# Patient Record
Sex: MALE | Race: BLACK OR AFRICAN AMERICAN | NOT HISPANIC OR LATINO | Employment: UNEMPLOYED | URBAN - METROPOLITAN AREA
[De-identification: names, ages, dates, MRNs, and addresses within clinical notes are randomized per-mention and may not be internally consistent; named-entity substitution may affect disease eponyms.]

---

## 2022-06-09 LAB
ANION GAP SERPL CALCULATED.3IONS-SCNC: 6 MMOL/L (ref 3–14)
BASOPHILS # BLD AUTO: 0 10E3/UL (ref 0–0.2)
BASOPHILS NFR BLD AUTO: 0 %
BUN SERPL-MCNC: 13 MG/DL (ref 7–30)
CALCIUM SERPL-MCNC: 10.4 MG/DL (ref 8.5–10.1)
CHLORIDE BLD-SCNC: 107 MMOL/L (ref 94–109)
CO2 SERPL-SCNC: 24 MMOL/L (ref 20–32)
CREAT SERPL-MCNC: 0.79 MG/DL (ref 0.66–1.25)
EOSINOPHIL # BLD AUTO: 0 10E3/UL (ref 0–0.7)
EOSINOPHIL NFR BLD AUTO: 0 %
ERYTHROCYTE [DISTWIDTH] IN BLOOD BY AUTOMATED COUNT: 15.2 % (ref 10–15)
GFR SERPL CREATININE-BSD FRML MDRD: >90 ML/MIN/1.73M2
GLUCOSE BLD-MCNC: 138 MG/DL (ref 70–99)
HCT VFR BLD AUTO: 43.9 % (ref 40–53)
HGB BLD-MCNC: 14.7 G/DL (ref 13.3–17.7)
IMM GRANULOCYTES # BLD: 0 10E3/UL
IMM GRANULOCYTES NFR BLD: 0 %
LYMPHOCYTES # BLD AUTO: 0.8 10E3/UL (ref 0.8–5.3)
LYMPHOCYTES NFR BLD AUTO: 8 %
MCH RBC QN AUTO: 26.2 PG (ref 26.5–33)
MCHC RBC AUTO-ENTMCNC: 33.5 G/DL (ref 31.5–36.5)
MCV RBC AUTO: 78 FL (ref 78–100)
MONOCYTES # BLD AUTO: 0.3 10E3/UL (ref 0–1.3)
MONOCYTES NFR BLD AUTO: 3 %
NEUTROPHILS # BLD AUTO: 9 10E3/UL (ref 1.6–8.3)
NEUTROPHILS NFR BLD AUTO: 89 %
NRBC # BLD AUTO: 0 10E3/UL
NRBC BLD AUTO-RTO: 0 /100
PLATELET # BLD AUTO: 407 10E3/UL (ref 150–450)
POTASSIUM BLD-SCNC: 3.9 MMOL/L (ref 3.4–5.3)
RBC # BLD AUTO: 5.62 10E6/UL (ref 4.4–5.9)
SODIUM SERPL-SCNC: 137 MMOL/L (ref 133–144)
WBC # BLD AUTO: 10.2 10E3/UL (ref 4–11)

## 2022-06-09 PROCEDURE — 99285 EMERGENCY DEPT VISIT HI MDM: CPT | Mod: 25

## 2022-06-09 PROCEDURE — 80048 BASIC METABOLIC PNL TOTAL CA: CPT | Performed by: EMERGENCY MEDICINE

## 2022-06-09 PROCEDURE — 36415 COLL VENOUS BLD VENIPUNCTURE: CPT | Performed by: EMERGENCY MEDICINE

## 2022-06-09 PROCEDURE — 85025 COMPLETE CBC W/AUTO DIFF WBC: CPT | Performed by: EMERGENCY MEDICINE

## 2022-06-09 PROCEDURE — 87637 SARSCOV2&INF A&B&RSV AMP PRB: CPT | Performed by: EMERGENCY MEDICINE

## 2022-06-10 ENCOUNTER — HOSPITAL ENCOUNTER (EMERGENCY)
Facility: CLINIC | Age: 27
Discharge: HOME | End: 2022-06-10
Attending: EMERGENCY MEDICINE | Admitting: EMERGENCY MEDICINE
Payer: COMMERCIAL

## 2022-06-10 VITALS
RESPIRATION RATE: 20 BRPM | TEMPERATURE: 99 F | OXYGEN SATURATION: 100 % | WEIGHT: 150.79 LBS | DIASTOLIC BLOOD PRESSURE: 61 MMHG | SYSTOLIC BLOOD PRESSURE: 108 MMHG | HEART RATE: 74 BPM

## 2022-06-10 DIAGNOSIS — F11.93 OPIOID WITHDRAWAL (H): ICD-10-CM

## 2022-06-10 LAB
FLUAV RNA SPEC QL NAA+PROBE: NEGATIVE
FLUBV RNA RESP QL NAA+PROBE: NEGATIVE
RSV RNA SPEC NAA+PROBE: NEGATIVE
SARS-COV-2 RNA RESP QL NAA+PROBE: NEGATIVE

## 2022-06-10 PROCEDURE — 250N000011 HC RX IP 250 OP 636: Performed by: EMERGENCY MEDICINE

## 2022-06-10 PROCEDURE — 258N000003 HC RX IP 258 OP 636: Performed by: EMERGENCY MEDICINE

## 2022-06-10 PROCEDURE — 96361 HYDRATE IV INFUSION ADD-ON: CPT

## 2022-06-10 PROCEDURE — 96376 TX/PRO/DX INJ SAME DRUG ADON: CPT

## 2022-06-10 PROCEDURE — 96375 TX/PRO/DX INJ NEW DRUG ADDON: CPT

## 2022-06-10 PROCEDURE — 96374 THER/PROPH/DIAG INJ IV PUSH: CPT

## 2022-06-10 PROCEDURE — 250N000013 HC RX MED GY IP 250 OP 250 PS 637: Performed by: EMERGENCY MEDICINE

## 2022-06-10 RX ORDER — ONDANSETRON 2 MG/ML
4 INJECTION INTRAMUSCULAR; INTRAVENOUS EVERY 30 MIN PRN
Status: DISCONTINUED | OUTPATIENT
Start: 2022-06-10 | End: 2022-06-10 | Stop reason: HOSPADM

## 2022-06-10 RX ORDER — METOCLOPRAMIDE HYDROCHLORIDE 5 MG/ML
10 INJECTION INTRAMUSCULAR; INTRAVENOUS ONCE
Status: COMPLETED | OUTPATIENT
Start: 2022-06-10 | End: 2022-06-10

## 2022-06-10 RX ORDER — BUPRENORPHINE AND NALOXONE 2; .5 MG/1; MG/1
1 FILM, SOLUBLE BUCCAL; SUBLINGUAL ONCE
Status: DISCONTINUED | OUTPATIENT
Start: 2022-06-10 | End: 2022-06-10 | Stop reason: HOSPADM

## 2022-06-10 RX ORDER — ONDANSETRON 4 MG/1
4 TABLET, ORALLY DISINTEGRATING ORAL EVERY 8 HOURS PRN
Qty: 10 TABLET | Refills: 0 | Status: SHIPPED | OUTPATIENT
Start: 2022-06-10 | End: 2022-06-13

## 2022-06-10 RX ORDER — LORAZEPAM 2 MG/ML
1 INJECTION INTRAMUSCULAR ONCE
Status: COMPLETED | OUTPATIENT
Start: 2022-06-10 | End: 2022-06-10

## 2022-06-10 RX ORDER — KETOROLAC TROMETHAMINE 30 MG/ML
30 INJECTION, SOLUTION INTRAMUSCULAR; INTRAVENOUS ONCE
Status: COMPLETED | OUTPATIENT
Start: 2022-06-10 | End: 2022-06-10

## 2022-06-10 RX ORDER — CLONIDINE HYDROCHLORIDE 0.1 MG/1
0.1 TABLET ORAL ONCE
Status: COMPLETED | OUTPATIENT
Start: 2022-06-10 | End: 2022-06-10

## 2022-06-10 RX ADMIN — LORAZEPAM 1 MG: 2 INJECTION INTRAMUSCULAR at 02:10

## 2022-06-10 RX ADMIN — METOCLOPRAMIDE HYDROCHLORIDE 10 MG: 5 INJECTION INTRAMUSCULAR; INTRAVENOUS at 01:24

## 2022-06-10 RX ADMIN — CLONIDINE HYDROCHLORIDE 0.1 MG: 0.1 TABLET ORAL at 01:46

## 2022-06-10 RX ADMIN — SODIUM CHLORIDE 1000 ML: 9 INJECTION, SOLUTION INTRAVENOUS at 00:43

## 2022-06-10 RX ADMIN — ONDANSETRON 4 MG: 2 INJECTION INTRAMUSCULAR; INTRAVENOUS at 01:22

## 2022-06-10 RX ADMIN — KETOROLAC TROMETHAMINE 30 MG: 30 INJECTION, SOLUTION INTRAMUSCULAR at 00:43

## 2022-06-10 RX ADMIN — ONDANSETRON 4 MG: 2 INJECTION INTRAMUSCULAR; INTRAVENOUS at 00:43

## 2022-06-10 ASSESSMENT — ENCOUNTER SYMPTOMS
MYALGIAS: 1
DIARRHEA: 1
CHILLS: 1
NAUSEA: 1
ABDOMINAL PAIN: 1
VOMITING: 1

## 2022-06-10 NOTE — ED TRIAGE NOTES
body aches, shakes, stomach pain, nausea, vomiting, dry heaves for last 12 hours. No one else at home is ill. Pt has tylenol and ibuprofen at home but has not taken any.pt vomit x10-20.
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
## 2022-06-10 NOTE — ED PROVIDER NOTES
Received signout from Dr. Chand to follow-up on opiate withdrawal.  I did order a dose of Suboxone and will discharge home with a course of Zofran he is tolerating p.o. and is appropriate for outpatient management     Maxi Lynn MD  06/10/22 2823

## 2022-06-11 ENCOUNTER — HOSPITAL ENCOUNTER (EMERGENCY)
Facility: CLINIC | Age: 27
Discharge: HOME | End: 2022-06-11
Attending: EMERGENCY MEDICINE | Admitting: EMERGENCY MEDICINE
Payer: COMMERCIAL

## 2022-06-11 VITALS
TEMPERATURE: 98.8 F | DIASTOLIC BLOOD PRESSURE: 79 MMHG | RESPIRATION RATE: 18 BRPM | HEART RATE: 72 BPM | OXYGEN SATURATION: 99 % | SYSTOLIC BLOOD PRESSURE: 122 MMHG

## 2022-06-11 VITALS
TEMPERATURE: 97.8 F | SYSTOLIC BLOOD PRESSURE: 118 MMHG | OXYGEN SATURATION: 100 % | DIASTOLIC BLOOD PRESSURE: 64 MMHG | RESPIRATION RATE: 12 BRPM | HEART RATE: 71 BPM

## 2022-06-11 DIAGNOSIS — F11.90 OPIOID USE: ICD-10-CM

## 2022-06-11 DIAGNOSIS — R11.2 NON-INTRACTABLE VOMITING WITH NAUSEA: ICD-10-CM

## 2022-06-11 DIAGNOSIS — R94.31 ABNORMAL ELECTROCARDIOGRAM: ICD-10-CM

## 2022-06-11 DIAGNOSIS — F12.90 MARIJUANA USE: ICD-10-CM

## 2022-06-11 DIAGNOSIS — R19.7 NAUSEA VOMITING AND DIARRHEA: ICD-10-CM

## 2022-06-11 DIAGNOSIS — F11.90 OPIATE USE: ICD-10-CM

## 2022-06-11 DIAGNOSIS — R11.2 NAUSEA VOMITING AND DIARRHEA: ICD-10-CM

## 2022-06-11 LAB
ALBUMIN SERPL-MCNC: 4.5 G/DL (ref 3.4–5)
ALBUMIN SERPL-MCNC: 4.9 G/DL (ref 3.4–5)
ALP SERPL-CCNC: 62 U/L (ref 40–150)
ALP SERPL-CCNC: 66 U/L (ref 40–150)
ALT SERPL W P-5'-P-CCNC: 21 U/L (ref 0–70)
ALT SERPL W P-5'-P-CCNC: 23 U/L (ref 0–70)
ANION GAP SERPL CALCULATED.3IONS-SCNC: 7 MMOL/L (ref 3–14)
ANION GAP SERPL CALCULATED.3IONS-SCNC: 8 MMOL/L (ref 3–14)
AST SERPL W P-5'-P-CCNC: 18 U/L (ref 0–45)
AST SERPL W P-5'-P-CCNC: 44 U/L (ref 0–45)
ATRIAL RATE - MUSE: 68 BPM
ATRIAL RATE - MUSE: 71 BPM
BASOPHILS # BLD AUTO: 0 10E3/UL (ref 0–0.2)
BASOPHILS # BLD AUTO: 0 10E3/UL (ref 0–0.2)
BASOPHILS NFR BLD AUTO: 0 %
BASOPHILS NFR BLD AUTO: 0 %
BILIRUB SERPL-MCNC: 1 MG/DL (ref 0.2–1.3)
BILIRUB SERPL-MCNC: 1.1 MG/DL (ref 0.2–1.3)
BUN SERPL-MCNC: 27 MG/DL (ref 7–30)
BUN SERPL-MCNC: 28 MG/DL (ref 7–30)
CALCIUM SERPL-MCNC: 10.1 MG/DL (ref 8.5–10.1)
CALCIUM SERPL-MCNC: 9.8 MG/DL (ref 8.5–10.1)
CHLORIDE BLD-SCNC: 107 MMOL/L (ref 94–109)
CHLORIDE BLD-SCNC: 109 MMOL/L (ref 94–109)
CO2 SERPL-SCNC: 25 MMOL/L (ref 20–32)
CO2 SERPL-SCNC: 27 MMOL/L (ref 20–32)
CREAT SERPL-MCNC: 0.94 MG/DL (ref 0.66–1.25)
CREAT SERPL-MCNC: 0.98 MG/DL (ref 0.66–1.25)
DIASTOLIC BLOOD PRESSURE - MUSE: NORMAL MMHG
DIASTOLIC BLOOD PRESSURE - MUSE: NORMAL MMHG
EOSINOPHIL # BLD AUTO: 0 10E3/UL (ref 0–0.7)
EOSINOPHIL # BLD AUTO: 0 10E3/UL (ref 0–0.7)
EOSINOPHIL NFR BLD AUTO: 0 %
EOSINOPHIL NFR BLD AUTO: 0 %
ERYTHROCYTE [DISTWIDTH] IN BLOOD BY AUTOMATED COUNT: 15.2 % (ref 10–15)
ERYTHROCYTE [DISTWIDTH] IN BLOOD BY AUTOMATED COUNT: 15.3 % (ref 10–15)
FLUAV RNA SPEC QL NAA+PROBE: NEGATIVE
FLUBV RNA RESP QL NAA+PROBE: NEGATIVE
GFR SERPL CREATININE-BSD FRML MDRD: >90 ML/MIN/1.73M2
GFR SERPL CREATININE-BSD FRML MDRD: >90 ML/MIN/1.73M2
GLUCOSE BLD-MCNC: 111 MG/DL (ref 70–99)
GLUCOSE BLD-MCNC: 128 MG/DL (ref 70–99)
HCT VFR BLD AUTO: 44.2 % (ref 40–53)
HCT VFR BLD AUTO: 45.2 % (ref 40–53)
HGB BLD-MCNC: 14.8 G/DL (ref 13.3–17.7)
HGB BLD-MCNC: 15 G/DL (ref 13.3–17.7)
IMM GRANULOCYTES # BLD: 0 10E3/UL
IMM GRANULOCYTES # BLD: 0 10E3/UL
IMM GRANULOCYTES NFR BLD: 0 %
IMM GRANULOCYTES NFR BLD: 0 %
INTERPRETATION ECG - MUSE: NORMAL
INTERPRETATION ECG - MUSE: NORMAL
LIPASE SERPL-CCNC: 68 U/L (ref 73–393)
LYMPHOCYTES # BLD AUTO: 1.3 10E3/UL (ref 0.8–5.3)
LYMPHOCYTES # BLD AUTO: 1.6 10E3/UL (ref 0.8–5.3)
LYMPHOCYTES NFR BLD AUTO: 16 %
LYMPHOCYTES NFR BLD AUTO: 17 %
MAGNESIUM SERPL-MCNC: 2.6 MG/DL (ref 1.6–2.3)
MAGNESIUM SERPL-MCNC: 3 MG/DL (ref 1.6–2.3)
MCH RBC QN AUTO: 26 PG (ref 26.5–33)
MCH RBC QN AUTO: 26.1 PG (ref 26.5–33)
MCHC RBC AUTO-ENTMCNC: 32.7 G/DL (ref 31.5–36.5)
MCHC RBC AUTO-ENTMCNC: 33.9 G/DL (ref 31.5–36.5)
MCV RBC AUTO: 77 FL (ref 78–100)
MCV RBC AUTO: 79 FL (ref 78–100)
MONOCYTES # BLD AUTO: 0.7 10E3/UL (ref 0–1.3)
MONOCYTES # BLD AUTO: 1 10E3/UL (ref 0–1.3)
MONOCYTES NFR BLD AUTO: 10 %
MONOCYTES NFR BLD AUTO: 9 %
NEUTROPHILS # BLD AUTO: 5.7 10E3/UL (ref 1.6–8.3)
NEUTROPHILS # BLD AUTO: 7.6 10E3/UL (ref 1.6–8.3)
NEUTROPHILS NFR BLD AUTO: 74 %
NEUTROPHILS NFR BLD AUTO: 74 %
NRBC # BLD AUTO: 0 10E3/UL
NRBC # BLD AUTO: 0 10E3/UL
NRBC BLD AUTO-RTO: 0 /100
NRBC BLD AUTO-RTO: 0 /100
P AXIS - MUSE: 74 DEGREES
P AXIS - MUSE: 75 DEGREES
PLATELET # BLD AUTO: 347 10E3/UL (ref 150–450)
PLATELET # BLD AUTO: 399 10E3/UL (ref 150–450)
POTASSIUM BLD-SCNC: 3.5 MMOL/L (ref 3.4–5.3)
POTASSIUM BLD-SCNC: 3.8 MMOL/L (ref 3.4–5.3)
PR INTERVAL - MUSE: 138 MS
PR INTERVAL - MUSE: 138 MS
PROT SERPL-MCNC: 8.9 G/DL (ref 6.8–8.8)
PROT SERPL-MCNC: 9.2 G/DL (ref 6.8–8.8)
QRS DURATION - MUSE: 96 MS
QRS DURATION - MUSE: 96 MS
QT - MUSE: 416 MS
QT - MUSE: 420 MS
QTC - MUSE: 446 MS
QTC - MUSE: 452 MS
R AXIS - MUSE: 47 DEGREES
R AXIS - MUSE: 53 DEGREES
RBC # BLD AUTO: 5.7 10E6/UL (ref 4.4–5.9)
RBC # BLD AUTO: 5.75 10E6/UL (ref 4.4–5.9)
RSV RNA SPEC NAA+PROBE: NEGATIVE
SARS-COV-2 RNA RESP QL NAA+PROBE: NEGATIVE
SODIUM SERPL-SCNC: 140 MMOL/L (ref 133–144)
SODIUM SERPL-SCNC: 143 MMOL/L (ref 133–144)
SYSTOLIC BLOOD PRESSURE - MUSE: NORMAL MMHG
SYSTOLIC BLOOD PRESSURE - MUSE: NORMAL MMHG
T AXIS - MUSE: -30 DEGREES
T AXIS - MUSE: -44 DEGREES
TROPONIN I SERPL HS-MCNC: 4 NG/L
VENTRICULAR RATE- MUSE: 68 BPM
VENTRICULAR RATE- MUSE: 71 BPM
WBC # BLD AUTO: 10.3 10E3/UL (ref 4–11)
WBC # BLD AUTO: 7.8 10E3/UL (ref 4–11)

## 2022-06-11 PROCEDURE — 85025 COMPLETE CBC W/AUTO DIFF WBC: CPT | Performed by: EMERGENCY MEDICINE

## 2022-06-11 PROCEDURE — C9803 HOPD COVID-19 SPEC COLLECT: HCPCS

## 2022-06-11 PROCEDURE — 84484 ASSAY OF TROPONIN QUANT: CPT | Performed by: EMERGENCY MEDICINE

## 2022-06-11 PROCEDURE — 93005 ELECTROCARDIOGRAM TRACING: CPT | Mod: RTG

## 2022-06-11 PROCEDURE — 36415 COLL VENOUS BLD VENIPUNCTURE: CPT | Performed by: EMERGENCY MEDICINE

## 2022-06-11 PROCEDURE — 250N000011 HC RX IP 250 OP 636: Performed by: EMERGENCY MEDICINE

## 2022-06-11 PROCEDURE — 84155 ASSAY OF PROTEIN SERUM: CPT | Performed by: EMERGENCY MEDICINE

## 2022-06-11 PROCEDURE — 96375 TX/PRO/DX INJ NEW DRUG ADDON: CPT

## 2022-06-11 PROCEDURE — 83690 ASSAY OF LIPASE: CPT | Performed by: EMERGENCY MEDICINE

## 2022-06-11 PROCEDURE — 99285 EMERGENCY DEPT VISIT HI MDM: CPT | Mod: 25

## 2022-06-11 PROCEDURE — 87637 SARSCOV2&INF A&B&RSV AMP PRB: CPT | Performed by: EMERGENCY MEDICINE

## 2022-06-11 PROCEDURE — 250N000013 HC RX MED GY IP 250 OP 250 PS 637: Performed by: EMERGENCY MEDICINE

## 2022-06-11 PROCEDURE — 83735 ASSAY OF MAGNESIUM: CPT | Performed by: EMERGENCY MEDICINE

## 2022-06-11 PROCEDURE — 258N000003 HC RX IP 258 OP 636: Performed by: EMERGENCY MEDICINE

## 2022-06-11 PROCEDURE — 96361 HYDRATE IV INFUSION ADD-ON: CPT

## 2022-06-11 PROCEDURE — 80053 COMPREHEN METABOLIC PANEL: CPT | Performed by: EMERGENCY MEDICINE

## 2022-06-11 PROCEDURE — 96374 THER/PROPH/DIAG INJ IV PUSH: CPT

## 2022-06-11 PROCEDURE — 96376 TX/PRO/DX INJ SAME DRUG ADON: CPT

## 2022-06-11 RX ORDER — LORAZEPAM 0.5 MG/1
0.5 TABLET ORAL ONCE
Status: COMPLETED | OUTPATIENT
Start: 2022-06-11 | End: 2022-06-11

## 2022-06-11 RX ORDER — SODIUM CHLORIDE 9 MG/ML
1000 INJECTION, SOLUTION INTRAVENOUS CONTINUOUS
Status: DISCONTINUED | OUTPATIENT
Start: 2022-06-11 | End: 2022-06-12 | Stop reason: HOSPADM

## 2022-06-11 RX ORDER — DIPHENHYDRAMINE HYDROCHLORIDE 50 MG/ML
25 INJECTION INTRAMUSCULAR; INTRAVENOUS ONCE
Status: COMPLETED | OUTPATIENT
Start: 2022-06-11 | End: 2022-06-11

## 2022-06-11 RX ORDER — ONDANSETRON 4 MG/1
4 TABLET, ORALLY DISINTEGRATING ORAL EVERY 8 HOURS PRN
Qty: 10 TABLET | Refills: 0 | Status: SHIPPED | OUTPATIENT
Start: 2022-06-11 | End: 2022-06-14

## 2022-06-11 RX ORDER — LIDOCAINE HYDROCHLORIDE 10 MG/ML
INJECTION, SOLUTION EPIDURAL; INFILTRATION; INTRACAUDAL; PERINEURAL
Status: DISCONTINUED
Start: 2022-06-11 | End: 2022-06-12 | Stop reason: HOSPADM

## 2022-06-11 RX ORDER — ONDANSETRON 2 MG/ML
4 INJECTION INTRAMUSCULAR; INTRAVENOUS ONCE
Status: COMPLETED | OUTPATIENT
Start: 2022-06-11 | End: 2022-06-11

## 2022-06-11 RX ORDER — CLONIDINE HYDROCHLORIDE 0.1 MG/1
0.1 TABLET ORAL ONCE
Status: DISCONTINUED | OUTPATIENT
Start: 2022-06-11 | End: 2022-06-11

## 2022-06-11 RX ORDER — LORAZEPAM 2 MG/ML
0.5 INJECTION INTRAMUSCULAR ONCE
Status: COMPLETED | OUTPATIENT
Start: 2022-06-11 | End: 2022-06-11

## 2022-06-11 RX ORDER — DROPERIDOL 2.5 MG/ML
0.62 INJECTION, SOLUTION INTRAMUSCULAR; INTRAVENOUS ONCE
Status: COMPLETED | OUTPATIENT
Start: 2022-06-11 | End: 2022-06-11

## 2022-06-11 RX ADMIN — SODIUM CHLORIDE 1000 ML: 9 INJECTION, SOLUTION INTRAVENOUS at 20:09

## 2022-06-11 RX ADMIN — ONDANSETRON 4 MG: 2 INJECTION INTRAMUSCULAR; INTRAVENOUS at 02:59

## 2022-06-11 RX ADMIN — DIPHENHYDRAMINE HYDROCHLORIDE 25 MG: 50 INJECTION, SOLUTION INTRAMUSCULAR; INTRAVENOUS at 18:50

## 2022-06-11 RX ADMIN — DROPERIDOL 0.62 MG: 2.5 INJECTION, SOLUTION INTRAMUSCULAR; INTRAVENOUS at 20:09

## 2022-06-11 RX ADMIN — LORAZEPAM 0.5 MG: 2 INJECTION INTRAMUSCULAR; INTRAVENOUS at 21:29

## 2022-06-11 RX ADMIN — FAMOTIDINE 20 MG: 10 INJECTION, SOLUTION INTRAVENOUS at 03:18

## 2022-06-11 RX ADMIN — LORAZEPAM 0.5 MG: 0.5 TABLET ORAL at 04:43

## 2022-06-11 RX ADMIN — DROPERIDOL 0.62 MG: 2.5 INJECTION, SOLUTION INTRAMUSCULAR; INTRAVENOUS at 18:52

## 2022-06-11 RX ADMIN — LORAZEPAM 0.5 MG: 2 INJECTION INTRAMUSCULAR; INTRAVENOUS at 03:19

## 2022-06-11 RX ADMIN — SODIUM CHLORIDE 1000 ML: 9 INJECTION, SOLUTION INTRAVENOUS at 02:58

## 2022-06-11 ASSESSMENT — ENCOUNTER SYMPTOMS
BLOOD IN STOOL: 0
NAUSEA: 1
HEADACHES: 1
CHILLS: 0
DIARRHEA: 1
SHORTNESS OF BREATH: 0
VOMITING: 1
FEVER: 0
NAUSEA: 1
DIARRHEA: 1
VOMITING: 1
ABDOMINAL PAIN: 1

## 2022-06-11 NOTE — ED PROVIDER NOTES
History     Chief Complaint:  Nausea, Vomiting, & Diarrhea       HPI   Mason Mandel is a 26 year old male who presents with nausea, vomiting, diarrhea.  Note, this is the patient's third visit in 3 days in the ER for similar symptoms thought to be due to opiate withdrawal.  Reports being unable to tolerate p.o. despite being discharged with clonidine and Zofran yesterday.  Reports using opioid medications that he buys from the streets 4 days ago which was his last use.  Also smoked marijuana 4 days ago.  He denies any additional drug use or alcohol use.  Denies fever, chest pain, urinary symptoms, melanic stools.  Reports blood streaks in his vomit.  Reports intermittent abdominal pain with vomiting.  Nausea/vomiting does improve with hot showers.  Denies any sick contacts.  Due to recurrence of nausea/vomiting and inability to tolerate PO, he presents for evaluation.    ROS:  Review of Systems   Constitutional: Negative for chills and fever.   Respiratory: Negative for shortness of breath.    Cardiovascular: Negative for chest pain.   Gastrointestinal: Positive for abdominal pain, diarrhea, nausea and vomiting. Negative for blood in stool.   Genitourinary: Negative.    Neurological: Positive for headaches.   All other systems reviewed and are negative.       Allergies:  No Known Allergies     Medications:    omeprazole (PRILOSEC) 20 MG DR capsule  ondansetron (ZOFRAN ODT) 4 MG ODT tab  ondansetron (ZOFRAN ODT) 4 MG ODT tab        Past Medical History:    Past Medical History:   Diagnosis Date     Cannabis dependence (H)      Opiate abuse, episodic (H)        Past Surgical History:    No past surgical history on file.     Family History:    family history is not on file.    Social History:     PCP: [facility], [facility]     Physical Exam     Patient Vitals for the past 24 hrs:   BP Temp Pulse Resp SpO2   06/11/22 2227 -- -- -- 12 --   06/11/22 2200 118/64 -- 71 18 100 %   06/11/22 2132 -- -- -- 16  --   06/11/22 2100 120/79 -- 70 -- --   06/11/22 2000 116/89 -- 66 -- --   06/11/22 1900 119/86 -- 75 -- 100 %   06/11/22 1845 105/69 -- 59 -- --   06/11/22 1830 120/66 -- 62 (!) 31 100 %   06/11/22 1815 119/77 -- 76 10 100 %   06/11/22 1800 123/47 97.8  F (36.6  C) -- 12 --        Physical Exam  General: Alert, no acute distress; well appearing  Neuro:  No focal deficits  HEENT:  Moist mucous membranes.Conjunctiva normal.   CV:  RRR, no m/r/g, skin warm and well perfused  Pulm:  CTAB, no wheezes/ronchi/rales.  No acute distress, breathing comfortably  GI:  Soft, nontender, nondistended.  No rebound or guarding.    MSK:  Moving all extremities.  No focal areas of edema, erythema  Skin:  WWP, no rashes, no lower extremity edema, skin color normal    Emergency Department Course   ECG:  None    Imaging:  No orders to display        Laboratory:  Labs Ordered and Resulted from Time of ED Arrival to Time of ED Departure   COMPREHENSIVE METABOLIC PANEL - Abnormal       Result Value    Sodium 143      Potassium 3.5      Chloride 109      Carbon Dioxide (CO2) 27      Anion Gap 7      Urea Nitrogen 27      Creatinine 0.94      Calcium 9.8      Glucose 111 (*)     Alkaline Phosphatase 62      AST 44      ALT 23      Protein Total 8.9 (*)     Albumin 4.5      Bilirubin Total 1.1      GFR Estimate >90     MAGNESIUM - Abnormal    Magnesium 3.0 (*)    CBC WITH PLATELETS AND DIFFERENTIAL - Abnormal    WBC Count 7.8      RBC Count 5.70      Hemoglobin 14.8      Hematocrit 45.2      MCV 79      MCH 26.0 (*)     MCHC 32.7      RDW 15.2 (*)     Platelet Count 347      % Neutrophils 74      % Lymphocytes 17      % Monocytes 9      % Eosinophils 0      % Basophils 0      % Immature Granulocytes 0      NRBCs per 100 WBC 0      Absolute Neutrophils 5.7      Absolute Lymphocytes 1.3      Absolute Monocytes 0.7      Absolute Eosinophils 0.0      Absolute Basophils 0.0      Absolute Immature Granulocytes 0.0      Absolute NRBCs 0.0           Procedures   None    Emergency Department Course:             Reviewed:  I reviewed nursing notes, vitals and past medical history    Assessments:   I obtained history and examined the patient as noted above.    I rechecked the patient and explained findings.       Consults:   None    Interventions:  Medications   sodium chloride 0.9% infusion (has no administration in time range)   droperidol (INAPSINE) injection 0.625 mg (0.625 mg Intravenous Given 22)   diphenhydrAMINE (BENADRYL) injection 25 mg (25 mg Intravenous Given 22)   droperidol (INAPSINE) injection 0.625 mg (0.625 mg Intravenous Given 22)   0.9% sodium chloride BOLUS (0 mLs Intravenous Stopped 22)   LORazepam (ATIVAN) injection 0.5 mg (0.5 mg Intravenous Given 22)        Disposition:  The patient was discharged to home.     Impression & Plan        Medical Decision Makin-year-old male with history of opiate use, marijuana use who presents for evaluation of nausea/vomiting and diarrhea.  Please see above for details of HPI and exam.  Patient seen here last night for similar symptoms and discharged with clonidine and Zofran with concern for opiate withdrawal.  I do suspect his symptoms are secondary to this, possibly also secondary to cannabis use as he is afebrile, vitally stable and has a completely benign abdominal exam.  I do not feel CT imaging is indicated, no signs of intra-abdominal catastrophe or obstruction.  Basic lab studies unchanged from yesterday.  IV fluids and antiemetics were provided.  He felt significantly improved and able to tolerate oral challenge here in the ER.  He is safe to discharge home with ongoing symptomatic care.  Recommend complete cessation of illicit drugs including marijuana.  Patient declined outpatient resources for chemical dependency        Diagnosis:    ICD-10-CM    1. Non-intractable vomiting with nausea  R11.2    2. Opiate use  F11.90    3. Marijuana use   F12.90         Discharge Medications:  Discharge Medication List as of 6/11/2022 10:07 PM           6/11/2022   Sascha Aragon MD [place], Edgar Ronald, MD  06/11/22 7134

## 2022-06-11 NOTE — ED TRIAGE NOTES
"Pt arrives via EMS from home with symptoms of opioid withdrawal. Nausea and vomiting. Was seen last night for same symptoms, sent home with sublingual zofran, unable to take as \"it makes me more sick.\" Pt also reports \"seizure-like shakes.\" VSS, GCS 15, A&Ox4. ABCs intact     Triage Assessment     Row Name 06/11/22 0239       Triage Assessment (Adult)    Airway WDL WDL       Respiratory WDL    Respiratory WDL WDL       Skin Circulation/Temperature WDL    Skin Circulation/Temperature WDL WDL       Cardiac WDL    Cardiac WDL WDL       Peripheral/Neurovascular WDL    Peripheral Neurovascular WDL WDL       Cognitive/Neuro/Behavioral WDL    Cognitive/Neuro/Behavioral WDL WDL              "

## 2022-06-11 NOTE — ED TRIAGE NOTES
A&O x4.  ABC's intact.      Pt arrives via EMS for the 2nd time today for N/V/D d/t fentanyl withdrawal.  Has not had fentanyl x 4 days.  Pt discharged with clonidine & zofran.  Per report pt vomited up the oral zofran but was given IV zofran from EMS.

## 2022-06-11 NOTE — ED PROVIDER NOTES
"  History     Chief Complaint:  Nausea, Vomiting, & Diarrhea and Drug / Alcohol Assessment       HPI   Mason Mandel is a 26 year old male with a history of opioid use disorder, depression, anxiety who presents with nausea, vomiting and diarrhea.  Patient was seen here yesterday for similar symptoms, see note below. He was discharged with clonidine and zofran given concern for opioid withdrawal. Patient reports for the past 3 days being unable to tolerate p.o.  He reports last using oxycodone 30 mg 3 days prior.  He does admit to marijuana use though denies any additional drug use.  He denies any fevers, chills, dyspnea, chest pain, significant abdominal pain, diarrhea, black/bloody stools.  He also reports \"seizure-like shakes\" because his body feels \"all over the place.\"  No sick contacts or suspicious foods.      \"Mason Mandel is a 26 year old male with a history of opioid use disorder, depression, anxiety, insomnia, and ROSHAN who presents to the emergency department for evaluation of withdrawal symptoms. The patient reports his last use of oxycodone was yesterday. He reports that he purchases these pills off of the street, and he is not sure what they are cut with. He reports he has been experiencing body aches, shakes, abdominal pain, nausea, vomiting, and dry heaves for the last twelve hours. This prompted him to present to the emergency department. Here, Mason reports he has been through treatment before. He is dry heaving into an emesis bag. He notes the does not have a primary care provider in the [facility], but sees a provider in [place].\" - (6/10/22 note)    ROS:  Review of Systems   Gastrointestinal: Positive for diarrhea, nausea and vomiting.   All other systems reviewed and are negative.    Allergies:  No Known Allergies     Medications:    Clonidine  Zofran    Past Medical History:    Depression  Anxiety    Past Surgical History:    Denies    Family History:    Denies    Social History:   "   PCP: [facility]   Denies ETOH; admits to marijuana use    Physical Exam   Patient Vitals for the past 24 hrs:   BP Temp Temp src Pulse Resp SpO2   06/11/22 0236 131/69 98.8  F (37.1  C) Oral 68 18 96 %        Physical Exam  Nursing note and vitals reviewed.  Constitutional: Well nourished. Dry heaving at bedside  Eyes: Conjunctiva normal.  Pupils are equal, round, and reactive to light.   ENT: Nose normal. Mucous membranes pink and moist.    Neck: Normal range of motion.  CVS: Normal rate, regular rhythm.  Normal heart sounds.  No murmur.  Pulmonary: Lungs clear to auscultation bilaterally. No wheezes/rales/rhonchi.  GI: Abdomen soft. Nontender, nondistended. No rigidity or guarding.  No CVA tenderness  MSK: No calf tenderness or swelling.  Neuro: Alert. Follows simple commands.  Skin: Skin is warm and dry. No rash noted.   Psychiatric: Normal affect.       Emergency Department Course   ECG:  ECG results from 06/11/22   EKG 12 lead     Value    Systolic Blood Pressure     Diastolic Blood Pressure     Ventricular Rate 71    Atrial Rate 71    IN Interval 138    QRS Duration 96        QTc 452    P Axis 75    R AXIS 53    T Axis -44    Interpretation ECG      Sinus rhythm  Minimal voltage criteria for LVH, may be normal variant  T wave abnormality, consider inferior ischemia  Abnormal ECG  No previous ECGs available     EKG 12 lead     Value    Systolic Blood Pressure     Diastolic Blood Pressure     Ventricular Rate 68    Atrial Rate 68    IN Interval 138    QRS Duration 96        QTc 446    P Axis 74    R AXIS 47    T Axis -30    Interpretation ECG      Sinus rhythm  T wave abnormality, consider inferior ischemia  Abnormal ECG  When compared with ECG of 11-JUN-2022 03:23, (unconfirmed)  No significant change was found         Laboratory:  Labs Ordered and Resulted from Time of ED Arrival to Time of ED Departure   COMPREHENSIVE METABOLIC PANEL - Abnormal       Result Value    Sodium 140       Potassium 3.8      Chloride 107      Carbon Dioxide (CO2) 25      Anion Gap 8      Urea Nitrogen 28      Creatinine 0.98      Calcium 10.1      Glucose 128 (*)     Alkaline Phosphatase 66      AST 18      ALT 21      Protein Total 9.2 (*)     Albumin 4.9      Bilirubin Total 1.0      GFR Estimate >90     LIPASE - Abnormal    Lipase 68 (*)    CBC WITH PLATELETS AND DIFFERENTIAL - Abnormal    WBC Count 10.3      RBC Count 5.75      Hemoglobin 15.0      Hematocrit 44.2      MCV 77 (*)     MCH 26.1 (*)     MCHC 33.9      RDW 15.3 (*)     Platelet Count 399      % Neutrophils 74      % Lymphocytes 16      % Monocytes 10      % Eosinophils 0      % Basophils 0      % Immature Granulocytes 0      NRBCs per 100 WBC 0      Absolute Neutrophils 7.6      Absolute Lymphocytes 1.6      Absolute Monocytes 1.0      Absolute Eosinophils 0.0      Absolute Basophils 0.0      Absolute Immature Granulocytes 0.0      Absolute NRBCs 0.0     MAGNESIUM - Abnormal    Magnesium 2.6 (*)    INFLUENZA A/B & SARS-COV2 PCR MULTIPLEX - Normal    Influenza A PCR Negative      Influenza B PCR Negative      RSV PCR Negative      SARS CoV2 PCR Negative     TROPONIN I - Normal    Troponin I High Sensitivity 4          Emergency Department Course:      Reviewed:  I reviewed nursing notes, vitals and past medical history    Assessments:   I obtained history and examined the patient as noted above.    I rechecked the patient and explained findings        Interventions:  Medications   0.9% sodium chloride BOLUS (0 mLs Intravenous Stopped 6/11/22 0403)   ondansetron (ZOFRAN) injection 4 mg (4 mg Intravenous Given 6/11/22 0259)   LORazepam (ATIVAN) injection 0.5 mg (0.5 mg Intravenous Given 6/11/22 0319)   famotidine (PEPCID) injection 20 mg (20 mg Intravenous Given 6/11/22 0318)   LORazepam (ATIVAN) tablet 0.5 mg (0.5 mg Oral Given 6/11/22 0443)        Disposition:  The patient was discharged to home.     Impression & Plan      Medical Decision  "Making:  Patient is a 26-year-old male presenting with predominately complaints of nausea, vomiting and diarrhea.  He is dry heaving on arrival though nontoxic, in no significant distress.  He has no reproducible abdominal tenderness and I doubt intra-abdominal catastrophe.  I do not feel emergent imaging is warranted at this point in time.  Labs without profound anemia or significant electrolyte derangements.  He was tested for COVID-19/influenza in light of his symptoms though result though both negative. EKG with nonspecific T wave inversions; he denies active chest pain; low suspicion for ACS.  High sensitivity screening troponin negative.  Strong concern for likely component of opiate withdraw though I do have concerns that patient may be exhibiting signs of hyperemesis induced from marijuana use.  He was given IV fluids, antiemetics, Pepcid and Ativan.  On reevaluation, patient reported symptom improvement.  He was able to tolerate ice chips in addition to p.o. Ativan without difficulty.  Patient's girlfriend arrived to the ED and expressed significant concerns in addition to patient that \"I will go home and just start vomiting again.\"  I discussed at this point in time I do not feel further emergent work-up is warranted given patient has tolerated p.o. without difficulty.  I did express my concerns for opiate withdraw in addition to potential marijuana induced hyperemesis though will provide antiemetics on discharge as well as Prilosec.  He already has rx for clonidine.  Supportive care with PO hydration encouraged. We discussed offering outpatient detox resources though he is declining at this point in time.  Girlfriend and patient became somewhat frustrated with me and I did my best to deescalate and reassure them that should patient develop fever, intractable vomiting, worsening pain or should symptoms worsen or change,we would be happy to reevaluate in the ED. Counseled extensively on recommendation for " ceasing use of marijuana and opioids.     Diagnosis:    ICD-10-CM    1. Nausea vomiting and diarrhea  R11.2     R19.7    2. Abnormal electrocardiogram  R94.31    3. Opioid use  F11.90    4. Marijuana use  F12.90         Discharge Medications:  New Prescriptions    OMEPRAZOLE (PRILOSEC) 20 MG DR CAPSULE    Take 2 capsules (40 mg) by mouth daily for 10 days    ONDANSETRON (ZOFRAN ODT) 4 MG ODT TAB    Take 1 tablet (4 mg) by mouth every 8 hours as needed for nausea        6/11/2022   Mariel De Jesus, Marile Quintanilla,   06/11/22 2136

## 2022-06-11 NOTE — ED NOTES
Bed: ED40  Expected date:   Expected time:   Means of arrival:   Comments:  A531 26m seen earlier, n/v

## 2022-06-11 NOTE — ED NOTES
"RN discussed discharge with patient and significant other, gave prescriptions, and went over follow up information. Pt requests to see MD. MD and RN went in to room together. Pt expressed concern that things were not getting better. MD explained that at this time, admission and further intervention is not recommended, very comfortable with discharge at this time. Pt and pt's SO became hostile, MD able to deescalate to best of ability. MD offered pepcid prescriptions, detox information, and discussed extensively about indications for discharge. P refuses detox information at this time. Pt's SO very frustrated with MD and RN asking \"Can you guys just get out?\". MD wrote for pepcid, RN gave printed prescriptions. Pt expressed that he is unable to take oral zofran. RN provided education about sublingual administration route, instructed pt to wait 15-20 minutes after administration before attempting to drink water, and to only take small sips at a time.   " Telephone Encounter by Drea Vasquez MA at 12/22/17 09:23 AM     Author:  Drea Vasquez MA Service:  (none) Author Type:  Medical Assistant     Filed:  12/22/17 09:28 AM Encounter Date:  12/21/2017 Status:  Signed     :  Drea Vasquez MA (Medical Assistant)            Patient notified.[AZ1.1T]          Revision History        User Key Date/Time User Provider Type Action    > AZ1.1 12/22/17 09:28 AM Drea Vasquez MA Medical Assistant Sign    T - Template